# Patient Record
(demographics unavailable — no encounter records)

---

## 2025-05-28 NOTE — PROCEDURE
[Hoarseness] : hoarseness not clearly evaluated by indirect laryngoscopy [None] : none [Flexible Endoscope] : examined with the flexible endoscope [True Vocal Cords Erythematous] : bilateral true vocal cord edema [Normal] : posterior cricoid area had healthy pink mucosa in the interarytenoid area and the esophageal inlet [de-identified] : small glottic gap

## 2025-05-28 NOTE — HISTORY OF PRESENT ILLNESS
[de-identified] : Patient is a 69 year old female referred by Dr. Bautista.  Pt c/o hoarseness x 1 1/2 years.  Denies dysphagia, globus sensation.  Not taking any reflux medications. Pt has asthma and i son an inhaler and also has anxiety as well. Pt in a non smoker.